# Patient Record
(demographics unavailable — no encounter records)

---

## 2025-01-30 NOTE — HISTORY OF PRESENT ILLNESS
[FreeTextEntry1] : 30-year-old male presenting for evaluation right middle finger middle phalanx fracture.  Injury occurred approximately 5 days ago.  He was trying to remove a sign from a yard and experienced a pulling sensation in his digit.  He went to an urgent care where a middle finger middle phalanx fracture was seen.  He is given an AlumaFoam splint to the time.  He is otherwise healthy no prior injury to his hand.  He is not having significant pain today.

## 2025-01-30 NOTE — ASSESSMENT
[FreeTextEntry1] : 30-year-old male, right middle finger middle phalanx fracture.  He is nondisplaced -We discussed his injury in detail today.  I do not see any evidence of malrotation on examination and the fracture is nondisplaced on x-ray.  We did discuss that I cannot completely evaluate his digit as he cannot make a full composite fist or cascade however I do not see any evidence today.  We will require close follow-up to ensure that the fracture does not displace or that no rotational malalignment does occur or his underlying.  -Discussed how fracture takes by 4 to 6 weeks to heal.  We discussed that this fracture does require immobilization.  At this time I recommend continued intrinsic plus type of mobilization of the middle finger which was adjusted for him today.  -He will follow-up in 10 to 14 days for repeat x-rays.  If fracture remains nondisplaced likely transitioning to santiago taping at that time.  All questions answered.  Discussed the importance remaining nonweightbearing his right upper extremity to prevent displacement.

## 2025-01-30 NOTE — PHYSICAL EXAM
[de-identified] : Right middle finger -Swollen ecchymosis compared to the contralateral hand and other digits. -No open wounds or abrasion  he is able to flex at his FDS and FDP.  He is not able to make a full composite fist.  Then I placed to align her not able to completely evaluate his rotational alignment due to the stiffness in his finger but there is no evidence of malrotation on examination today Digit warm well-perfused No numbness or tingling [de-identified] : Three-view x-ray right middle finger including AP lateral bleak were taken today and personally viewed there is a oblique type fracture of the middle finger of the middle phalanx.  There is no displacement.  These are compared to films from 5 days ago at the ER and there is no interval displacement from today

## 2025-02-10 NOTE — ASSESSMENT
[FreeTextEntry1] : 30-year-old male, right middle finger middle phalanx fracture.   -Fracture zoë nondisplaced on 3 subsequent films. -We discussed his injury in detail today.  I do not see any evidence of malrotation on examination and the fracture is nondisplaced on x-ray.  He is still not able to make a full fist limited due to stiffness in the PIP joint as well as pain but I see no malrotation on 2 sequential exams. -Discussed how fracture takes by 4 to 6 weeks to heal.   -Will transition him to santiago taping today, he has abnormal curvature of his index finger bilaterally for this reason I recommended santiago taping with a spacer in between to the ring finger -He will benefit from hand therapy, hand therapy consult placed today for gentle range of motion exercises -Follow-up in 2 weeks for repeat examination

## 2025-02-10 NOTE — HISTORY OF PRESENT ILLNESS
[FreeTextEntry1] : 30-year-old male here for follow-up right middle finger fracture of his middle phalanx.  Since last being seen he has been wearing AlumaFoam splint he is occasionally coming out to work of range of motion.  His pain is improving, no issues at this time.

## 2025-02-10 NOTE — PHYSICAL EXAM
[de-identified] : Right middle finger -Swollen ecchymosis compared to the contralateral hand and other digits.  Improved since prior visit but still present -No open wounds or abrasion  he is able to flex at his FDS and FDP.  He is not able to make a full composite fist.  He is able to make a better fist than his prior visit however and there is no rotational malalignment alignment evident, no coronal angulation or deformity Digit warm well-perfused No numbness or tingling [de-identified] : Three-view x-ray right middle finger including AP lateral bleak were taken today and personally viewed there is a oblique type fracture of the middle finger of the middle phalanx.  There is no displacement.  These are compared to films from x-rays 11 days ago and there is been no interval displacement

## 2025-03-03 NOTE — PHYSICAL EXAM
[de-identified] : Right middle finger -Swelling localized near PIP joint, mild tenderness palpation over the distal aspect of the middle phalanx. -No open wounds or abrasion  he is able to flex at his FDS and FDP.  He is not able to make a full composite fist.  He is able to make a better fist than his prior visit however and there is no rotational malalignment alignment evident, no coronal angulation or deformity Digit warm well-perfused No numbness or tingling  He is concerned about the curvature of his index finger, when compared to the contralateral side is equal and symmetric in terms of curvature at the location of the PIP joint.  This can be seen on his x-ray [de-identified] : Three-view x-ray right middle finger including AP lateral and oblique were taken today and personally viewed these demonstrate healing fracture, no displacement of the middle phalanx fracture since prior visit.

## 2025-03-03 NOTE — HISTORY OF PRESENT ILLNESS
[FreeTextEntry1] : 30-year-old male here for repeat follow-up, he is approximately 5 to 6 weeks status post injury to his right middle finger.  He reports he is pain is improving.  He still has stiffness.  He has not yet seen hand therapy.  He has been wearing AlumaFoam splint at night and santiago taping/strapping during the day.

## 2025-03-03 NOTE — ASSESSMENT
[FreeTextEntry1] : 30-year-old male, right middle finger middle phalanx fracture.   -Fracture zoë nondisplaced on 4 subsequent films.  He still has pain in this region radiographically does not heal but he is healing. -At this time point I recommended beginning hand therapy.  He previously was given a hand therapy consult last visit however he has not yet had time to go.  He is not taking any pain medication. -I recommend discontinue the AlumaFoam splint at night this time, he should santiago strap it when out of the house or when sleeping for the next 2 weeks. -He has stiffness at the PIP joint.  He will benefit from skilled hand therapy and at home education program. -Discussed remaining nonweightbearing he should work on range of motion only beginning with active motion guided with hand therapy. -Follow-up in 3 to 4 weeks for repeat x-rays and examination.

## 2025-04-03 NOTE — PHYSICAL EXAM
[de-identified] : Right middle finger -Tenderness has resolved over the prior fracture site, mild swelling persist throughout the middle finger -No open wounds or abrasion  he is able to flex at his FDS and FDP.  He is not able to make a full composite fist.  He is able to make a better fist than his prior visit however and there is no rotational malalignment alignment evident, no coronal angulation or deformity.  He has approximately 80 degrees active flexion of the PIP joint Digit warm well-perfused No numbness or tingling  [de-identified] : Three-view x-ray right middle finger including AP lateral and oblique were taken today and personally viewed these demonstrate healed fracture, no displacement of the middle phalanx fracture since prior visit.

## 2025-04-03 NOTE — ASSESSMENT
[FreeTextEntry1] : 30-year-old male, right middle finger middle phalanx fracture.   -This time point clinically and radiographically is healed.  He does have stiffness in the PIP joint expected given the immobilization and the fracture. -At this time point I recommended continue hand therapy.  He reports he is going and he states that he has been experiencing improvement since doing so.  He has been working on desensitization as well as range of motion exercises.  He should begin passive range of motion, therapy prescription updated today -We discussed stiffness and PIP joint can last for several months even after the fracture heals.  At this point he is not able to make a full composite fist and this is the goal going forward.  He should begin aggressive range of motion as well as progressive weightbearing as tolerated. -Follow-up 6 weeks for range of motion examination as well as new x-rays middle finger

## 2025-04-03 NOTE — HISTORY OF PRESENT ILLNESS
[FreeTextEntry1] : 3-year-old male, here for follow-up, he is now approximately 8 weeks status post injury with right middle finger middle phalanx fracture.  He is been working therapy.  He reports he is doing well.  He reports his pain is improved.  He reports his motion is improved.

## 2025-05-15 NOTE — PHYSICAL EXAM
[de-identified] : Right middle finger -Tenderness has resolved over the prior fracture site, swelling resolved -No open wounds or abrasion  he is able to flex at his FDS and FDP.  He is not able to make a full composite fist.  He is able to make a better fist than his prior visit however and there is no rotational malalignment alignment evident, no coronal angulation or deformity.  He has approximately 180s flexion of the PIP joint Digit warm well-perfused No numbness or tingling  [de-identified] : Three-view x-ray right middle finger including AP lateral oblique taken today in person views demonstrate healed middle phalanx fracture no arthritic change

## 2025-05-15 NOTE — ASSESSMENT
[FreeTextEntry1] : 30-year-old male, he is 4 months with right middle finger middle phalanx fracture.  - Clinically and radiographically he has completely healed.  Bone is in anatomic position.  There is no degenerative changes seen on the DIP or PIP joints.  He has been working extensively with therapy.  He is going twice a week but he is up reports that he has missed the past few sessions.  I reinforced the importance of therapy as well as doing home therapy.  He should continue aggressive range of motion of the digit at this time I do still have mild residual stiffness at the PIP joint.  We discussed that this will not self resolve and needs aggressive treatment.  We discussed anti-inflammatories as needed.  He should continue to work on range of motion on a daily basis.  No restrictions.

## 2025-05-15 NOTE — HISTORY OF PRESENT ILLNESS
[FreeTextEntry1] : 30-year-old male presenting for evaluation as well as follow-up.  He is now 4 months with right middle finger middle phalanx fracture.  He has been working extensively therapy.  He is improved range of motion.  No issues at this time